# Patient Record
(demographics unavailable — no encounter records)

---

## 2024-10-15 NOTE — ASSESSMENT
[FreeTextEntry1] : The visit was provided via telehealth using real-time 2-way audio visual technology. The patient, Alexandro Ordoñez, was located at home, [address] [place] [address], at the time of the visit. The Genetic Counselor, Lance Cai, was located at the medical office located in [place], [place]. The physician, Dr. Allen Rider, was located at the medical office in [place], [place]. The patient and both providers participated in the telehealth encounter. Consent for telehealth services was given on 10/15/2024 by the patient, Alexandro Ordoñez.   REASON FOR CONSULT Alexandro Ordoñez is a 48-year-old male who was seen 10/15/2024 for a discussion regarding his genetic testing results related to hereditary cancer predisposition.   Mr. Ordoñez was originally seen at [facility] on 06/19/2024 for hereditary cancer predisposition risk assessment. He has no personal history of cancer but his father, Mason, pursued genetic testing using a [employer] breast/gyn cancer and melanoma panel (25 genes) and a pathogenic mutation was identified in the BRCA1 gene (c.3375_3376delTC; p.[id number]*6) (05/22/2024). Mr. Ordoñez decided to pursue BRCA1 single site analysis offered by [facility].   TEST RESULTS:   BRCA1 POSITIVE (c.3375_3376delTC; p.[id number]*6)  RESULTS INTERPRETATION AND ASSESSMENT:  We reviewed with Mr. Ordoñez that he tested positive for a pathogenic mutation in the BRCA1 gene. 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 HBOC is an autosomal-dominant inherited cancer predisposition syndrome. Mr. Ordoñez was informed that individuals with a pathogenic BRCA1 mutation have increased risks for the following:    FEMALE BREAST CANCER RISK:  Lifetime risk to develop female breast cancer is estimated to be 65-79% compared to the general population risk of 12.9%.     MALE BREAST CANCER RISK:  Lifetime risk to develop male breast cancer is estimated to be up to 1-2% compared to the general population risk of <0.2%     OVARIAN CANCER RISK:  Lifetime risk to develop ovarian cancer is estimated to be 36-53% compared to the general population risk of 1.2%.     PROSTATE CANCER RISK:  Risk to develop prostate cancer by age 65 is estimated to be 5-7%. Risk by age 85 is estimated to be up to 17-19% compared to the general population risk of 12.1% (Lashae et al. 2020,  Urology, estimates derived from adjusted numbers to account for higher prostate cancer risk estimates for men undergoing PSA screening at regular intervals).    PANCREATIC CANCER RISK:  Lifetime risk to develop pancreatic cancer is estimated to be up to 3% compared to the general population risk of 1.6%.     MELANOMA RISK:  Melanoma is not clearly associated with BRCA1, however the current National Comprehensive Cancer Network (NCCN) Guidelines do not distinguish between BRCA1 and BRCA2 surveillance and recommends general melanoma risk management, as outlined below.      IMPLICATIONS FOR THE PATIENT:  Given Mr. Ordoñez' personal and current reported family history of cancer, and his BRCA1 positive genetic test results, the following screening guidelines and risk-reducing recommendations were discussed:     BREAST:   - We recommended self-breast exams and clinical breast exams annually starting at age 35. We also emphasized the importance of breast awareness.  - Men with gynecomastia may consider annual mammogram starting at age 50 or 10 years earlier than the earliest known male breast cancer diagnosis in the family, whichever comes first     PROSTATE:  - We discussed the pros and cons of prostate cancer screening and that it should be considered starting at approximately age 40.    MELANOMA:  - No specific screening guidelines exist, however, general melanoma risk management is appropriate, such as a full body skin examination by a physician and minimizing UV exposure.      PANCREATIC:  - At this time, there is no proven effective screening for pancreatic cancer, though the National Comprehensive Cancer Network (NCCN) states that investigational screening may be considered for individuals who have both an inherited mutation associated with an increased risk of pancreatic cancer and a family history of the disease.   - Given Mr. Ordoñez' age and current reported family history, screening is not recommended at this time. Mr. Ordoñez was however informed, that criteria for participating in investigational pancreatic cancer screening may change in the future, and Mr. Ordoñez was encouraged to re-contact the [facility] team every 2-3 years to discuss any possible changes in screening recommendations.   OTHER:  - In the absence of other indications, Mr. Ordoñez should practice age-appropriate cancer screening of other organ systems as recommended for the general population.    IMPLICATIONS FOR FAMILY MEMBERS:  This mutation is inherited in an autosomal dominant pattern. We recommend the patient's brother and paternal relatives pursue genetic counseling and genetic testing as there is (or up to) a 50% chance they also have the same mutation. Of note, Mr. Ordoñez' brother, Lourdes, has already pursued genetic testing, his follow-up is pending with our team. Mr. Ordoñez was made aware that if any at-risk relatives wanted to pursue genetic testing any time in the future, we would be happy to see them and coordinate testing. If they are not local, they can locate a genetic counselor using the National Society of Genetic Counselors, Find a Genetic Counselor Tool (www.nsgc.org/findageneticcounselor).     REPRODUCTIVE IMPLICATIONS AND OPTIONS  The risk of passing on this mutation to a future generation is 50%. Since the genetic mutation is known, pre-implantation genetic testing (PGT) is possible. PGT and prenatal diagnosis were discussed briefly for individuals of reproductive age. Of note, Mr. Ordoñez stated he is not planning on having children.    Of note, there have been a few case reports of individuals with biallelic mutations in the BRCA1 gene having Fanconi-like conditions. Fanconi anemia (FA) is an autosomal recessive condition characterized by physical abnormalities (i.e. short statures, skeletal malformations), bone marrow failure and increased risk for acute myeloid leukemia and other malignancies. For those of reproductive age, we recommend the partner of an individual who is a BRCA1 carrier also have BRCA1 genetic testing to assess the risk of having a child affected with this condition if it would inform reproductive decision making. If both individuals carry a single BRCA1 mutation, there may be up to a 25% chance for each pregnancy to be affected with a FA-like condition.     RESOURCES & SUPPORT GROUPS  Facing Our Risk of cancer Empowered (FORCE): www.FacingOurRisk.org    [place]right [place]: www.BrightPink.org   HIS Breast Cancer Awareness: https://www.hisbreastcancer.org/  National LGBT+ Cancer Network: https://cancer-network.org/    In addition, the oncology social workers at [facility] [place] are available to assist with more referrals, if necessary.    PLAN:  1. See above note for recommended management.  2. We encouraged sharing these results with family members as noted above. They have a risk to have inherited the same mutation. Other family may benefit from genetic testing and should contact a certified genetic counselor specializing in cancer. Due to HIPAA and [place] laws, Genetics is unable to directly contact other family at risk, but we are available should family members wish to reach out to us.  3. Family support resources and referrals were provided.   4. Patient informed consult note(s) will be available through their [email or web address] patient portal and genetic test results were previously released via [facility]'s laboratory portal.  5. Genetic knowledge changes rapidly. Given this, we encouraged re-contacting [facility] every 2-3 years for any changes in screening recommendations or sooner if there are significant changes in personal or family history.    For any additional questions please call [facility] at [phone].       Lance Cai MS, [facility] Genetic Counselor, [facility]  Allen Rider MD Chief, [facility]  CC:  Patient

## 2024-10-15 NOTE — ASSESSMENT
[FreeTextEntry1] : The visit was provided via telehealth using real-time 2-way audio visual technology. The patient, Alexandro Ordoñez, was located at home, [address] [place] [address], at the time of the visit. The Genetic Counselor, Lance Cai, was located at the medical office located in [place], [place]. The physician, Dr. Allen Rider, was located at the medical office in [place], [place]. The patient and both providers participated in the telehealth encounter. Consent for telehealth services was given on 10/15/2024 by the patient, Alexandro Ordoñez.   REASON FOR CONSULT Alexandro Ordoñez is a 48-year-old male who was seen 10/15/2024 for a discussion regarding his genetic testing results related to hereditary cancer predisposition.   Mr. Ordoñez was originally seen at [facility] on 06/19/2024 for hereditary cancer predisposition risk assessment. He has no personal history of cancer but his father, Mason, pursued genetic testing using a [employer] breast/gyn cancer and melanoma panel (25 genes) and a pathogenic mutation was identified in the BRCA1 gene (c.3375_3376delTC; p.[id number]*6) (05/22/2024). Mr. Ordoñez decided to pursue BRCA1 single site analysis offered by [facility].   TEST RESULTS:   BRCA1 POSITIVE (c.[id number]; p.[id number]*6)  RESULTS INTERPRETATION AND ASSESSMENT:  We reviewed with Mr. Ordoñez that he tested positive for a pathogenic mutation in the BRCA1 gene. 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 HBOC is an autosomal-dominant inherited cancer predisposition syndrome. Mr. Ordoñez was informed that individuals with a pathogenic BRCA1 mutation have increased risks for the following:    FEMALE BREAST CANCER RISK:  Lifetime risk to develop female breast cancer is estimated to be 65-79% compared to the general population risk of 12.9%.     MALE BREAST CANCER RISK:  Lifetime risk to develop male breast cancer is estimated to be up to 1-2% compared to the general population risk of <0.2%     OVARIAN CANCER RISK:  Lifetime risk to develop ovarian cancer is estimated to be 36-53% compared to the general population risk of 1.2%.     PROSTATE CANCER RISK:  Risk to develop prostate cancer by age 65 is estimated to be 5-7%. Risk by age 85 is estimated to be up to 17-19% compared to the general population risk of 12.1% (Lashae et al. 2020,  Urology, estimates derived from adjusted numbers to account for higher prostate cancer risk estimates for men undergoing PSA screening at regular intervals).    PANCREATIC CANCER RISK:  Lifetime risk to develop pancreatic cancer is estimated to be up to 3% compared to the general population risk of 1.6%.     MELANOMA RISK:  Melanoma is not clearly associated with BRCA1, however the current National Comprehensive Cancer Network (NCCN) Guidelines do not distinguish between BRCA1 and BRCA2 surveillance and recommends general melanoma risk management, as outlined below.      IMPLICATIONS FOR THE PATIENT:  Given Mr. Ordoñez' personal and current reported family history of cancer, and his BRCA1 positive genetic test results, the following screening guidelines and risk-reducing recommendations were discussed:     BREAST:   - We recommended self-breast exams and clinical breast exams annually starting at age 35. We also emphasized the importance of breast awareness.  - Men with gynecomastia may consider annual mammogram starting at age 50 or 10 years earlier than the earliest known male breast cancer diagnosis in the family, whichever comes first     PROSTATE:  - We discussed the pros and cons of prostate cancer screening and that it should be considered starting at approximately age 40.    MELANOMA:  - No specific screening guidelines exist, however, general melanoma risk management is appropriate, such as a full body skin examination by a physician and minimizing UV exposure.      PANCREATIC:  - At this time, there is no proven effective screening for pancreatic cancer, though the National Comprehensive Cancer Network (NCCN) states that investigational screening may be considered for individuals who have both an inherited mutation associated with an increased risk of pancreatic cancer and a family history of the disease.   - Given Mr. Ordoñez' age and current reported family history, screening is not recommended at this time. Mr. Ordoñez was however informed, that criteria for participating in investigational pancreatic cancer screening may change in the future, and Mr. Ordoñez was encouraged to re-contact the [facility] team every 2-3 years to discuss any possible changes in screening recommendations.   OTHER:  - In the absence of other indications, Mr. Ordoñez should practice age-appropriate cancer screening of other organ systems as recommended for the general population.    IMPLICATIONS FOR FAMILY MEMBERS:  This mutation is inherited in an autosomal dominant pattern. We recommend the patient's brother and paternal relatives pursue genetic counseling and genetic testing as there is (or up to) a 50% chance they also have the same mutation. Of note, Mr. Ordoñez' brother, Lourdes, has already pursued genetic testing, his follow-up is pending with our team. Mr. Ordoñez was made aware that if any at-risk relatives wanted to pursue genetic testing any time in the future, we would be happy to see them and coordinate testing. If they are not local, they can locate a genetic counselor using the National Society of Genetic Counselors, Find a Genetic Counselor Tool (www.nsgc.org/findageneticcounselor).     REPRODUCTIVE IMPLICATIONS AND OPTIONS  The risk of passing on this mutation to a future generation is 50%. Since the genetic mutation is known, pre-implantation genetic testing (PGT) is possible. PGT and prenatal diagnosis were discussed briefly for individuals of reproductive age. Of note, Mr. Ordoñez stated he is not planning on having children.    Of note, there have been a few case reports of individuals with biallelic mutations in the BRCA1 gene having Fanconi-like conditions. Fanconi anemia (FA) is an autosomal recessive condition characterized by physical abnormalities (i.e. short statures, skeletal malformations), bone marrow failure and increased risk for acute myeloid leukemia and other malignancies. For those of reproductive age, we recommend the partner of an individual who is a BRCA1 carrier also have BRCA1 genetic testing to assess the risk of having a child affected with this condition if it would inform reproductive decision making. If both individuals carry a single BRCA1 mutation, there may be up to a 25% chance for each pregnancy to be affected with a FA-like condition.     RESOURCES & SUPPORT GROUPS  Facing Our Risk of cancer Empowered (FORCE): www.FacingOurRisk.org    [place]: www.BrightPink.org   HIS Breast Cancer Awareness: https://www.hisbreastcancer.org/  National LGBT+ Cancer Network: https://cancer-network.org/    In addition, the oncology social workers at [facility] [place] are available to assist with more referrals, if necessary.    PLAN:  1. See above note for recommended management.  2. We encouraged sharing these results with family members as noted above. They have a risk to have inherited the same mutation. Other family may benefit from genetic testing and should contact a certified genetic counselor specializing in cancer. Due to HIPAA and [place] laws, Genetics is unable to directly contact other family at risk, but we are available should family members wish to reach out to us.  3. Family support resources and referrals were provided.   4. Patient informed consult note(s) will be available through their [employer] patient portal and genetic test results were previously released via [facility] laboratory portal.  5. Genetic knowledge changes rapidly. Given this, we encouraged re-contacting [facility] every 2-3 years for any changes in screening recommendations or sooner if there are significant changes in personal or family history.    For any additional questions please call [facility] at [phone].       Lance Cai MS, [facility] Genetic Counselor, [facility]  Allen Rider MD Chief, [facility]  CC:  Patient

## 2025-05-19 NOTE — HISTORY OF PRESENT ILLNESS
[de-identified] : Mr. Alexandro Ordoñez is a 48 y.o M who is BRCA 1 +, referred by Dr. Mahmood, here for an initial visit.  Father is positive for BRCA 1. Patient pursued genetic testing and is also positive for BRCA 1 Multiple family members are also BRCA 1+   PMH: denies Current everyday smoker and in the process of quitting. PSA and will follow with urologist in July 2025. Consult with Dr. Rider in 10/2024

## 2025-05-19 NOTE — CONSULT LETTER
[Consult Letter:] : I had the pleasure of evaluating your patient, [unfilled]. [Please see my note below.] : Please see my note below. [Sincerely,] : Sincerely, [FreeTextEntry2] : Dr. Mahmood [FreeTextEntry3] : Ramiro Hayden M.D., FFAVIOLA. Associate Professor of Surgery [place] [employer] [facility] [employer] at [facility]

## 2025-05-19 NOTE — PHYSICAL EXAM
[Normal] : supple, no neck mass and thyroid not enlarged [Normal Supraclavicular Lymph Nodes] : normal supraclavicular lymph nodes [Normal Axillary Lymph Nodes] : normal axillary lymph nodes [Normal] : oriented to person, place and time, with appropriate affect [FreeTextEntry1] : SC present for exam  [de-identified] : Normal S1,S2. Regular rate and rhythm [de-identified] : Normal male breast exam [de-identified] : Clear breath sounds bilaterally with normal respiratory effort.

## 2025-05-19 NOTE — HISTORY OF PRESENT ILLNESS
[de-identified] : Mr. Alexandro Ordoñez is a 48 y.o M who is BRCA 1 +, referred by Dr. Mahmood, here for an initial visit.  Father is positive for BRCA 1. Patient pursued genetic testing and is also positive for BRCA 1 Multiple family members are also BRCA 1+   PMH: denies Current everyday smoker and in the process of quitting. PSA and will follow with urologist in July 2025. Consult with Dr. Rider in 10/2024

## 2025-05-19 NOTE — PHYSICAL EXAM
[Normal] : supple, no neck mass and thyroid not enlarged [Normal Supraclavicular Lymph Nodes] : normal supraclavicular lymph nodes [Normal Axillary Lymph Nodes] : normal axillary lymph nodes [Normal] : oriented to person, place and time, with appropriate affect [FreeTextEntry1] : SC present for exam  [de-identified] : Normal S1,S2. Regular rate and rhythm [de-identified] : Normal male breast exam [de-identified] : Clear breath sounds bilaterally with normal respiratory effort.

## 2025-05-19 NOTE — HISTORY OF PRESENT ILLNESS
[de-identified] : Mr. Alexandro Ordoñez is a 48 y.o M who is BRCA 1 +, referred by Dr. Mahmood, here for an initial visit.  Father is positive for BRCA 1. Patient pursued genetic testing and is also positive for BRCA 1 Multiple family members are also BRCA 1+   PMH: denies Current everyday smoker and in the process of quitting. PSA and will follow with urologist in July 2025. Consult with Dr. Rider in 10/2024

## 2025-05-19 NOTE — CONSULT LETTER
[Consult Letter:] : I had the pleasure of evaluating your patient, [unfilled]. [Please see my note below.] : Please see my note below. [Sincerely,] : Sincerely, [FreeTextEntry2] : Dr. Mahmood [FreeTextEntry3] : Ramiro Hayden M.D., FFAVIOLA. Associate Professor of Surgery [place] [employer] [facility] [employer] at [facility]

## 2025-05-19 NOTE — PHYSICAL EXAM
[Normal] : supple, no neck mass and thyroid not enlarged [Normal Supraclavicular Lymph Nodes] : normal supraclavicular lymph nodes [Normal Axillary Lymph Nodes] : normal axillary lymph nodes [Normal] : oriented to person, place and time, with appropriate affect [FreeTextEntry1] : SC present for exam  [de-identified] : Normal S1,S2. Regular rate and rhythm [de-identified] : Normal male breast exam [de-identified] : Clear breath sounds bilaterally with normal respiratory effort.

## 2025-05-19 NOTE — CONSULT LETTER
[Consult Letter:] : I had the pleasure of evaluating your patient, [unfilled]. [Please see my note below.] : Please see my note below. [Sincerely,] : Sincerely, [FreeTextEntry2] : Dr. Mahmood [FreeTextEntry3] : Ramiro Hayden M.D., FFAVIOLA. Associate Professor of Surgery [place] [employer] [facility] [employer] at [facility]

## 2025-05-19 NOTE — ASSESSMENT
[FreeTextEntry1] : IMP: Alexandro is a 48 y.o M who is now BRCA 1 + Multiple family members also carry the gene  PLAN: MMG screening beginning at 50 Return in one year  All medical entries were at my, Dr. Ramiro Hayden, direction. I have reviewed the chart and agree that the record accurately reflects my personal performance of the history, physical exam, assessment and plan. Our office Nurse Practitioner was present of the duration of the office visit.